# Patient Record
Sex: MALE | Race: WHITE | HISPANIC OR LATINO | URBAN - METROPOLITAN AREA
[De-identification: names, ages, dates, MRNs, and addresses within clinical notes are randomized per-mention and may not be internally consistent; named-entity substitution may affect disease eponyms.]

---

## 2017-10-17 ENCOUNTER — GENERIC CONVERSION - ENCOUNTER (OUTPATIENT)
Dept: OTHER | Facility: OTHER | Age: 42
End: 2017-10-17

## 2018-01-10 NOTE — MISCELLANEOUS
Please contact our office at your convenience  It is important that we speak to you  REGARDING:   Porfavor contacte a nuestra oficina  Es muy  importante que hablemos con usted   SOBRE:          Scheduling an Appointment/ Programar alma rosa Carmen          Rescheduling an Appointment/ Re-programar  alma rosa Carmen     Cancelling an Appointment/Cancelar hussein Carmen     Your Lab/ X-ray Results/Resultados         Updating your Phone number or Address/ Actualizar hussein [employer]           X Verify your Primary Providor/ Verificar hussein Proveedor primario     Other/Otro:    Please Contact Our Office to Schedule Your Appointment  It  is Very Important That You See Your Provider for your  Routine Medical Care  If you are seeing a New Providor,  Please Contact our Office so we can update our Records  Thank You  Comuníquese con nuestra oficina para programar hussein carmen  Es Muy Importante que usted saeed hussein proveedor  para hussein   [address] [facility] Floor de [place]  Si usted esta viendo un Proveedor  [place], [place] con [place] oficina para actualizar   nuestro registros  Debbie